# Patient Record
Sex: FEMALE | URBAN - METROPOLITAN AREA
[De-identification: names, ages, dates, MRNs, and addresses within clinical notes are randomized per-mention and may not be internally consistent; named-entity substitution may affect disease eponyms.]

---

## 2023-08-23 ENCOUNTER — OFFICE VISIT (OUTPATIENT)
Dept: URBAN - METROPOLITAN AREA CLINIC 105 | Facility: CLINIC | Age: 48
End: 2023-08-23

## 2024-06-13 ENCOUNTER — OFFICE VISIT (OUTPATIENT)
Dept: URBAN - METROPOLITAN AREA CLINIC 92 | Facility: CLINIC | Age: 49
End: 2024-06-13

## 2025-04-16 ENCOUNTER — DASHBOARD ENCOUNTERS (OUTPATIENT)
Age: 50
End: 2025-04-16

## 2025-04-16 ENCOUNTER — OFFICE VISIT (OUTPATIENT)
Dept: URBAN - METROPOLITAN AREA CLINIC 105 | Facility: CLINIC | Age: 50
End: 2025-04-16
Payer: COMMERCIAL

## 2025-04-16 DIAGNOSIS — Z83.719 FAMILY HISTORY OF COLONIC POLYPS: ICD-10-CM

## 2025-04-16 DIAGNOSIS — Z12.11 SCREENING FOR COLON CANCER: ICD-10-CM

## 2025-04-16 DIAGNOSIS — I10 ESSENTIAL HYPERTENSION: ICD-10-CM

## 2025-04-16 DIAGNOSIS — D50.0 IRON DEFICIENCY ANEMIA DUE TO CHRONIC BLOOD LOSS: ICD-10-CM

## 2025-04-16 PROBLEM — 59621000: Status: ACTIVE | Noted: 2025-04-16

## 2025-04-16 PROBLEM — 724556004: Status: ACTIVE | Noted: 2025-04-16

## 2025-04-16 PROCEDURE — 99202 OFFICE O/P NEW SF 15 MIN: CPT | Performed by: INTERNAL MEDICINE

## 2025-04-16 PROCEDURE — 99242 OFF/OP CONSLTJ NEW/EST SF 20: CPT | Performed by: INTERNAL MEDICINE

## 2025-04-16 RX ORDER — AMLODIPINE BESYLATE 10 MG/1
TAKE 1 TABLET BY MOUTH ONCE A DAY TABLET ORAL
Qty: 90 EACH | Refills: 0 | Status: ACTIVE | COMMUNITY

## 2025-04-16 RX ORDER — HYDROCHLOROTHIAZIDE 25 MG/1
TABLET ORAL
Qty: 90 TABLET | Status: ACTIVE | COMMUNITY

## 2025-04-16 RX ORDER — CETIRIZINE HYDROCHLORIDE 10 MG/1
TAKE ONE TABLET BY MOUTH EVERY NIGHT AT BEDTIME TABLET ORAL
Qty: 21 UNSPECIFIED | Refills: 0 | Status: ACTIVE | COMMUNITY

## 2025-04-16 RX ORDER — ALBUTEROL SULFATE 90 UG/1
INHALE 2 PUFF INTO MOUTH EVERY FOUR TO SIX HOURS AS NEEDED (PRN) AEROSOL, METERED RESPIRATORY (INHALATION)
Qty: 18 GRAM | Refills: 0 | Status: ACTIVE | COMMUNITY

## 2025-04-16 NOTE — HPI-TODAY'S VISIT:
50-year-old female with PMH of HTN, iron deficiency anemia, asthma, allergies, and gastric bypass (2014) referred by Dr. Niya Rodrigues for colon cancer screening. A copy of this note will be sent to referring provider. There is no family history of colon cancer. Her mom and maternal aunt have history of colon polyps within the last 5 years. Patient denies change in bowel habits, appetite, and weight. Patient denies rectal bleeding. Patient denies any cardiac or kidney problems. Patient denies any digestive symptoms currently. Last colonoscopy: never. She did not take her BP medicines this morning. BP is usually 130-140/90s at home when she checks infrequently. No chest pain or SOB today. She has been hospitalized for asthma exacerbation with pneumonia but this was 10-15 years ago. Never intubated. She uses daily inhalers. States she is not on iron supplement anymore. States she last had labs around 10-11/2024. She does still have periods but they are becoming less frequent. She has history of heavy periods and fibroids.    Labs 5/2024 - Hgb 8.7, Hct 30.5, MCV 71.4, MCH 20.4, MCHC 28.5, platelets 474, CBC otherwise normal. CMP, TSH normal. A1c 6.3%. 5/2023 - B12, folate normal.

## 2025-05-09 ENCOUNTER — OFFICE VISIT (OUTPATIENT)
Dept: URBAN - METROPOLITAN AREA SURGERY CENTER 16 | Facility: SURGERY CENTER | Age: 50
End: 2025-05-09
Payer: COMMERCIAL

## 2025-05-09 DIAGNOSIS — Z12.11 COLON CANCER SCREENING: ICD-10-CM

## 2025-05-09 DIAGNOSIS — J44.9 COPD (CHRONIC OBSTRUCTIVE PULMONARY DISEASE): ICD-10-CM

## 2025-05-09 DIAGNOSIS — E66.01 MORBID (SEVERE) OBESITY DUE TO EXCESS CALORIES: ICD-10-CM

## 2025-05-09 DIAGNOSIS — Z83.719 FAMILY HISTORY OF COLON POLYPS, UNSPECIFIED: ICD-10-CM

## 2025-05-09 PROCEDURE — 00812 ANES LWR INTST SCR COLSC: CPT | Performed by: ANESTHESIOLOGIST ASSISTANT

## 2025-05-09 PROCEDURE — 0528F RCMND FLW-UP 10 YRS DOCD: CPT | Performed by: INTERNAL MEDICINE

## 2025-05-09 PROCEDURE — G0121 COLON CA SCRN NOT HI RSK IND: HCPCS | Performed by: INTERNAL MEDICINE

## 2025-05-09 PROCEDURE — 00812 ANES LWR INTST SCR COLSC: CPT | Performed by: ANESTHESIOLOGY

## 2025-05-09 RX ORDER — ALBUTEROL SULFATE 90 UG/1
INHALE 2 PUFF INTO MOUTH EVERY FOUR TO SIX HOURS AS NEEDED (PRN) AEROSOL, METERED RESPIRATORY (INHALATION)
Qty: 18 GRAM | Refills: 0 | Status: ACTIVE | COMMUNITY

## 2025-05-09 RX ORDER — AMLODIPINE BESYLATE 10 MG/1
TAKE 1 TABLET BY MOUTH ONCE A DAY TABLET ORAL
Qty: 90 EACH | Refills: 0 | Status: ACTIVE | COMMUNITY

## 2025-05-09 RX ORDER — HYDROCHLOROTHIAZIDE 25 MG/1
TABLET ORAL
Qty: 90 TABLET | Status: ACTIVE | COMMUNITY

## 2025-05-09 RX ORDER — CETIRIZINE HYDROCHLORIDE 10 MG/1
TAKE ONE TABLET BY MOUTH EVERY NIGHT AT BEDTIME TABLET ORAL
Qty: 21 UNSPECIFIED | Refills: 0 | Status: ACTIVE | COMMUNITY